# Patient Record
Sex: FEMALE | Race: WHITE | NOT HISPANIC OR LATINO | Employment: PART TIME | URBAN - METROPOLITAN AREA
[De-identification: names, ages, dates, MRNs, and addresses within clinical notes are randomized per-mention and may not be internally consistent; named-entity substitution may affect disease eponyms.]

---
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
## 2024-01-22 ENCOUNTER — POSTPARTUM VISIT (OUTPATIENT)
Dept: OBSTETRICS AND GYNECOLOGY | Facility: CLINIC | Age: 19
End: 2024-01-22
Payer: COMMERCIAL

## 2024-01-22 VITALS
BODY MASS INDEX: 31.7 KG/M2 | WEIGHT: 214 LBS | HEART RATE: 80 BPM | HEIGHT: 69 IN | DIASTOLIC BLOOD PRESSURE: 80 MMHG | SYSTOLIC BLOOD PRESSURE: 136 MMHG

## 2024-01-22 DIAGNOSIS — Z30.41 ENCOUNTER FOR SURVEILLANCE OF CONTRACEPTIVE PILLS: ICD-10-CM

## 2024-01-22 DIAGNOSIS — R03.0 ELEVATED BLOOD-PRESSURE READING WITHOUT DIAGNOSIS OF HYPERTENSION: Primary | ICD-10-CM

## 2024-01-22 PROCEDURE — 99213 OFFICE O/P EST LOW 20 MIN: CPT | Performed by: OBSTETRICS & GYNECOLOGY

## 2024-01-23 PROBLEM — R03.0 ELEVATED BLOOD-PRESSURE READING WITHOUT DIAGNOSIS OF HYPERTENSION: Status: ACTIVE | Noted: 2024-01-23

## 2024-02-19 ENCOUNTER — LAB (OUTPATIENT)
Dept: LAB | Facility: HOSPITAL | Age: 19
End: 2024-02-19
Payer: COMMERCIAL

## 2024-02-19 ENCOUNTER — OFFICE VISIT (OUTPATIENT)
Dept: FAMILY MEDICINE CLINIC | Facility: CLINIC | Age: 19
End: 2024-02-19
Payer: COMMERCIAL

## 2024-02-19 VITALS
OXYGEN SATURATION: 100 % | DIASTOLIC BLOOD PRESSURE: 66 MMHG | HEIGHT: 69 IN | SYSTOLIC BLOOD PRESSURE: 136 MMHG | WEIGHT: 212 LBS | HEART RATE: 76 BPM | BODY MASS INDEX: 31.4 KG/M2

## 2024-02-19 DIAGNOSIS — R19.7 DIARRHEA, UNSPECIFIED TYPE: Primary | ICD-10-CM

## 2024-02-19 DIAGNOSIS — R10.811 RIGHT UPPER QUADRANT ABDOMINAL TENDERNESS WITHOUT REBOUND TENDERNESS: ICD-10-CM

## 2024-02-19 DIAGNOSIS — K21.9 GASTROESOPHAGEAL REFLUX DISEASE WITHOUT ESOPHAGITIS: ICD-10-CM

## 2024-02-19 DIAGNOSIS — R19.7 DIARRHEA, UNSPECIFIED TYPE: ICD-10-CM

## 2024-02-19 LAB
ALBUMIN SERPL-MCNC: 4.6 G/DL (ref 3.5–5.2)
ALBUMIN/GLOB SERPL: 1.6 G/DL
ALP SERPL-CCNC: 55 U/L (ref 39–117)
ALT SERPL W P-5'-P-CCNC: 16 U/L (ref 1–33)
ANION GAP SERPL CALCULATED.3IONS-SCNC: 11.2 MMOL/L (ref 5–15)
AST SERPL-CCNC: 10 U/L (ref 1–32)
BASOPHILS # BLD AUTO: 0.05 10*3/MM3 (ref 0–0.2)
BASOPHILS NFR BLD AUTO: 0.7 % (ref 0–1.5)
BILIRUB SERPL-MCNC: 0.4 MG/DL (ref 0–1.2)
BUN SERPL-MCNC: 10 MG/DL (ref 6–20)
BUN/CREAT SERPL: 10.3 (ref 7–25)
CALCIUM SPEC-SCNC: 9.2 MG/DL (ref 8.6–10.5)
CHLORIDE SERPL-SCNC: 103 MMOL/L (ref 98–107)
CO2 SERPL-SCNC: 23.8 MMOL/L (ref 22–29)
CREAT SERPL-MCNC: 0.97 MG/DL (ref 0.57–1)
DEPRECATED RDW RBC AUTO: 41.3 FL (ref 37–54)
EGFRCR SERPLBLD CKD-EPI 2021: 86.5 ML/MIN/1.73
EOSINOPHIL # BLD AUTO: 0.08 10*3/MM3 (ref 0–0.4)
EOSINOPHIL NFR BLD AUTO: 1.1 % (ref 0.3–6.2)
ERYTHROCYTE [DISTWIDTH] IN BLOOD BY AUTOMATED COUNT: 14.6 % (ref 12.3–15.4)
GLOBULIN UR ELPH-MCNC: 2.9 GM/DL
GLUCOSE SERPL-MCNC: 71 MG/DL (ref 65–99)
H PYLORI IGG SER IA-ACNC: NEGATIVE
HCT VFR BLD AUTO: 39.2 % (ref 34–46.6)
HGB BLD-MCNC: 12.9 G/DL (ref 12–15.9)
IMM GRANULOCYTES # BLD AUTO: 0.03 10*3/MM3 (ref 0–0.05)
IMM GRANULOCYTES NFR BLD AUTO: 0.4 % (ref 0–0.5)
LYMPHOCYTES # BLD AUTO: 1.83 10*3/MM3 (ref 0.7–3.1)
LYMPHOCYTES NFR BLD AUTO: 24.3 % (ref 19.6–45.3)
MCH RBC QN AUTO: 26 PG (ref 26.6–33)
MCHC RBC AUTO-ENTMCNC: 32.9 G/DL (ref 31.5–35.7)
MCV RBC AUTO: 79 FL (ref 79–97)
MONOCYTES # BLD AUTO: 0.41 10*3/MM3 (ref 0.1–0.9)
MONOCYTES NFR BLD AUTO: 5.4 % (ref 5–12)
NEUTROPHILS NFR BLD AUTO: 5.14 10*3/MM3 (ref 1.7–7)
NEUTROPHILS NFR BLD AUTO: 68.1 % (ref 42.7–76)
NRBC BLD AUTO-RTO: 0 /100 WBC (ref 0–0.2)
PLATELET # BLD AUTO: 401 10*3/MM3 (ref 140–450)
PMV BLD AUTO: 10.9 FL (ref 6–12)
POTASSIUM SERPL-SCNC: 4.3 MMOL/L (ref 3.5–5.2)
PROT SERPL-MCNC: 7.5 G/DL (ref 6–8.5)
RBC # BLD AUTO: 4.96 10*6/MM3 (ref 3.77–5.28)
SODIUM SERPL-SCNC: 138 MMOL/L (ref 136–145)
WBC NRBC COR # BLD AUTO: 7.54 10*3/MM3 (ref 3.4–10.8)

## 2024-02-19 PROCEDURE — 1159F MED LIST DOCD IN RCRD: CPT | Performed by: NURSE PRACTITIONER

## 2024-02-19 PROCEDURE — 99214 OFFICE O/P EST MOD 30 MIN: CPT | Performed by: NURSE PRACTITIONER

## 2024-02-19 PROCEDURE — 85025 COMPLETE CBC W/AUTO DIFF WBC: CPT

## 2024-02-19 PROCEDURE — 36415 COLL VENOUS BLD VENIPUNCTURE: CPT

## 2024-02-19 PROCEDURE — 1160F RVW MEDS BY RX/DR IN RCRD: CPT | Performed by: NURSE PRACTITIONER

## 2024-02-19 PROCEDURE — 80053 COMPREHEN METABOLIC PANEL: CPT

## 2024-02-19 PROCEDURE — 86677 HELICOBACTER PYLORI ANTIBODY: CPT

## 2024-03-08 ENCOUNTER — HOSPITAL ENCOUNTER (OUTPATIENT)
Dept: ULTRASOUND IMAGING | Facility: HOSPITAL | Age: 19
Discharge: HOME | End: 2024-03-08
Payer: COMMERCIAL

## 2024-03-08 DIAGNOSIS — R10.811 RIGHT UPPER QUADRANT ABDOMINAL TENDERNESS WITHOUT REBOUND TENDERNESS: ICD-10-CM

## 2024-03-08 DIAGNOSIS — R19.7 DIARRHEA, UNSPECIFIED TYPE: ICD-10-CM

## 2024-03-08 PROCEDURE — 76705 ECHO EXAM OF ABDOMEN: CPT

## 2024-03-11 DIAGNOSIS — R10.811 RIGHT UPPER QUADRANT ABDOMINAL TENDERNESS WITHOUT REBOUND TENDERNESS: ICD-10-CM

## 2024-03-11 DIAGNOSIS — R19.7 DIARRHEA, UNSPECIFIED TYPE: Primary | ICD-10-CM

## 2024-04-24 ENCOUNTER — TELEPHONE (OUTPATIENT)
Dept: FAMILY MEDICINE CLINIC | Facility: CLINIC | Age: 19
End: 2024-04-24
Payer: COMMERCIAL

## 2024-04-29 ENCOUNTER — OFFICE VISIT (OUTPATIENT)
Dept: FAMILY MEDICINE CLINIC | Facility: CLINIC | Age: 19
End: 2024-04-29
Payer: COMMERCIAL

## 2024-04-29 VITALS
WEIGHT: 220 LBS | SYSTOLIC BLOOD PRESSURE: 132 MMHG | HEART RATE: 80 BPM | BODY MASS INDEX: 32.58 KG/M2 | OXYGEN SATURATION: 100 % | DIASTOLIC BLOOD PRESSURE: 66 MMHG | HEIGHT: 69 IN

## 2024-04-29 DIAGNOSIS — K59.00 CONSTIPATION, UNSPECIFIED CONSTIPATION TYPE: Primary | ICD-10-CM

## 2024-04-29 DIAGNOSIS — K62.5 BRIGHT RED BLOOD PER RECTUM: ICD-10-CM

## 2024-04-29 DIAGNOSIS — R35.0 URINARY FREQUENCY: ICD-10-CM

## 2024-04-29 DIAGNOSIS — R10.9 LEFT SIDED ABDOMINAL PAIN: ICD-10-CM

## 2024-04-29 LAB
BILIRUB BLD-MCNC: NEGATIVE MG/DL
CLARITY, POC: CLEAR
COLOR UR: YELLOW
EXPIRATION DATE: ABNORMAL
GLUCOSE UR STRIP-MCNC: NEGATIVE MG/DL
KETONES UR QL: NEGATIVE
LEUKOCYTE EST, POC: ABNORMAL
Lab: ABNORMAL
NITRITE UR-MCNC: NEGATIVE MG/ML
PH UR: 6.5 [PH] (ref 5–8)
PROT UR STRIP-MCNC: NEGATIVE MG/DL
RBC # UR STRIP: NEGATIVE /UL
SP GR UR: 1.03 (ref 1–1.03)
UROBILINOGEN UR QL: NORMAL

## 2024-04-29 PROCEDURE — 1160F RVW MEDS BY RX/DR IN RCRD: CPT | Performed by: NURSE PRACTITIONER

## 2024-04-29 PROCEDURE — 99214 OFFICE O/P EST MOD 30 MIN: CPT | Performed by: NURSE PRACTITIONER

## 2024-04-29 PROCEDURE — 87086 URINE CULTURE/COLONY COUNT: CPT | Performed by: NURSE PRACTITIONER

## 2024-04-29 PROCEDURE — 1159F MED LIST DOCD IN RCRD: CPT | Performed by: NURSE PRACTITIONER

## 2024-04-30 LAB — BACTERIA SPEC AEROBE CULT: NORMAL

## 2024-07-02 ENCOUNTER — OFFICE VISIT (OUTPATIENT)
Dept: FAMILY MEDICINE CLINIC | Facility: CLINIC | Age: 19
End: 2024-07-02
Payer: COMMERCIAL

## 2024-07-02 VITALS
SYSTOLIC BLOOD PRESSURE: 140 MMHG | DIASTOLIC BLOOD PRESSURE: 71 MMHG | BODY MASS INDEX: 34.36 KG/M2 | WEIGHT: 232 LBS | OXYGEN SATURATION: 100 % | HEIGHT: 69 IN | HEART RATE: 86 BPM

## 2024-07-02 DIAGNOSIS — E66.9 CLASS 1 OBESITY WITH BODY MASS INDEX (BMI) OF 34.0 TO 34.9 IN ADULT, UNSPECIFIED OBESITY TYPE, UNSPECIFIED WHETHER SERIOUS COMORBIDITY PRESENT: Primary | ICD-10-CM

## 2024-07-02 DIAGNOSIS — R63.5 WEIGHT GAIN: ICD-10-CM

## 2024-07-02 DIAGNOSIS — R03.0 ELEVATED BLOOD-PRESSURE READING WITHOUT DIAGNOSIS OF HYPERTENSION: ICD-10-CM

## 2024-07-02 PROBLEM — E66.811 CLASS 1 OBESITY WITH BODY MASS INDEX (BMI) OF 34.0 TO 34.9 IN ADULT: Status: ACTIVE | Noted: 2024-07-02

## 2024-07-02 PROCEDURE — 1159F MED LIST DOCD IN RCRD: CPT | Performed by: NURSE PRACTITIONER

## 2024-07-02 PROCEDURE — 1160F RVW MEDS BY RX/DR IN RCRD: CPT | Performed by: NURSE PRACTITIONER

## 2024-07-02 PROCEDURE — 99214 OFFICE O/P EST MOD 30 MIN: CPT | Performed by: NURSE PRACTITIONER

## 2024-07-02 RX ORDER — TOPIRAMATE 25 MG/1
25 TABLET ORAL 2 TIMES DAILY
Qty: 60 TABLET | Refills: 2 | Status: SHIPPED | OUTPATIENT
Start: 2024-07-02

## 2024-07-05 ENCOUNTER — TELEPHONE (OUTPATIENT)
Dept: FAMILY MEDICINE CLINIC | Facility: CLINIC | Age: 19
End: 2024-07-05
Payer: COMMERCIAL

## 2024-08-05 ENCOUNTER — OFFICE VISIT (OUTPATIENT)
Dept: FAMILY MEDICINE CLINIC | Facility: CLINIC | Age: 19
End: 2024-08-05
Payer: COMMERCIAL

## 2024-08-05 VITALS
WEIGHT: 227 LBS | DIASTOLIC BLOOD PRESSURE: 74 MMHG | OXYGEN SATURATION: 98 % | HEART RATE: 72 BPM | BODY MASS INDEX: 33.62 KG/M2 | SYSTOLIC BLOOD PRESSURE: 127 MMHG | HEIGHT: 69 IN | TEMPERATURE: 98.1 F

## 2024-08-05 DIAGNOSIS — R52 GENERALIZED BODY ACHES: ICD-10-CM

## 2024-08-05 DIAGNOSIS — J02.0 STREP PHARYNGITIS: Primary | ICD-10-CM

## 2024-08-05 DIAGNOSIS — U07.1 COVID-19 VIRUS DETECTED: ICD-10-CM

## 2024-08-05 DIAGNOSIS — J02.9 SORE THROAT: ICD-10-CM

## 2024-08-05 DIAGNOSIS — J06.9 UPPER RESPIRATORY TRACT INFECTION, UNSPECIFIED TYPE: ICD-10-CM

## 2024-08-05 LAB
EXPIRATION DATE: ABNORMAL
EXPIRATION DATE: ABNORMAL
FLUAV AG UPPER RESP QL IA.RAPID: NOT DETECTED
FLUBV AG UPPER RESP QL IA.RAPID: NOT DETECTED
INTERNAL CONTROL: ABNORMAL
INTERNAL CONTROL: ABNORMAL
Lab: ABNORMAL
Lab: ABNORMAL
S PYO AG THROAT QL: POSITIVE
SARS-COV-2 AG UPPER RESP QL IA.RAPID: DETECTED

## 2024-08-05 PROCEDURE — 87428 SARSCOV & INF VIR A&B AG IA: CPT | Performed by: STUDENT IN AN ORGANIZED HEALTH CARE EDUCATION/TRAINING PROGRAM

## 2024-08-05 PROCEDURE — 99213 OFFICE O/P EST LOW 20 MIN: CPT | Performed by: STUDENT IN AN ORGANIZED HEALTH CARE EDUCATION/TRAINING PROGRAM

## 2024-08-05 PROCEDURE — 87880 STREP A ASSAY W/OPTIC: CPT | Performed by: STUDENT IN AN ORGANIZED HEALTH CARE EDUCATION/TRAINING PROGRAM

## 2024-08-05 RX ORDER — AMOXICILLIN 500 MG/1
500 CAPSULE ORAL 2 TIMES DAILY
Qty: 20 CAPSULE | Refills: 0 | Status: SHIPPED | OUTPATIENT
Start: 2024-08-05 | End: 2024-08-15

## 2024-08-05 RX ORDER — GUAIFENESIN AND DEXTROMETHORPHAN HYDROBROMIDE 600; 30 MG/1; MG/1
1 TABLET, EXTENDED RELEASE ORAL 2 TIMES DAILY PRN
Qty: 8 TABLET | Refills: 0 | Status: SHIPPED | OUTPATIENT
Start: 2024-08-05

## 2024-10-11 ENCOUNTER — TELEPHONE (OUTPATIENT)
Dept: FAMILY MEDICINE CLINIC | Facility: CLINIC | Age: 19
End: 2024-10-11
Payer: COMMERCIAL

## 2024-11-04 ENCOUNTER — PATIENT MESSAGE (OUTPATIENT)
Dept: OBSTETRICS AND GYNECOLOGY | Facility: CLINIC | Age: 19
End: 2024-11-04
Payer: COMMERCIAL

## 2024-11-04 DIAGNOSIS — Z30.011 ENCOUNTER FOR INITIAL PRESCRIPTION OF CONTRACEPTIVE PILLS: ICD-10-CM

## 2024-11-04 RX ORDER — NORETHINDRONE ACETATE AND ETHINYL ESTRADIOL, AND FERROUS FUMARATE 1MG-20(24)
1 KIT ORAL DAILY
Qty: 28 TABLET | Refills: 12 | OUTPATIENT
Start: 2024-11-04

## 2024-11-04 RX ORDER — NORETHINDRONE ACETATE AND ETHINYL ESTRADIOL, AND FERROUS FUMARATE 1MG-20(24)
1 KIT ORAL DAILY
Qty: 28 TABLET | Refills: 12 | Status: SHIPPED | OUTPATIENT
Start: 2024-11-04

## 2024-11-04 NOTE — TELEPHONE ENCOUNTER
The pt is requesting refills of her Carolyne 24 Fe medication.  She was last seen on 01/22/24 and has a follow up schedueld for 01/27/25.  The Rx was last sent to  the pharmacy today #28 with 12 additional refills. I have denied this request as a duplicate order

## 2025-03-04 ENCOUNTER — TELEPHONE (OUTPATIENT)
Dept: FAMILY MEDICINE CLINIC | Facility: CLINIC | Age: 20
End: 2025-03-04

## 2025-03-04 ENCOUNTER — OFFICE VISIT (OUTPATIENT)
Dept: FAMILY MEDICINE CLINIC | Facility: CLINIC | Age: 20
End: 2025-03-04
Payer: COMMERCIAL

## 2025-03-04 VITALS
BODY MASS INDEX: 34.21 KG/M2 | HEART RATE: 75 BPM | OXYGEN SATURATION: 100 % | WEIGHT: 231 LBS | DIASTOLIC BLOOD PRESSURE: 82 MMHG | HEIGHT: 69 IN | SYSTOLIC BLOOD PRESSURE: 128 MMHG

## 2025-03-04 DIAGNOSIS — Z13.220 LIPID SCREENING: ICD-10-CM

## 2025-03-04 DIAGNOSIS — R14.3 EXCESSIVE GAS: ICD-10-CM

## 2025-03-04 DIAGNOSIS — Z00.00 ANNUAL PHYSICAL EXAM: Primary | ICD-10-CM

## 2025-03-04 DIAGNOSIS — Z83.3 FAMILY HISTORY OF DIABETES MELLITUS (DM): ICD-10-CM

## 2025-03-04 DIAGNOSIS — E16.2 HYPOGLYCEMIA: ICD-10-CM

## 2025-03-04 DIAGNOSIS — Z13.29 THYROID DISORDER SCREEN: ICD-10-CM

## 2025-03-04 DIAGNOSIS — E66.811 CLASS 1 OBESITY WITH BODY MASS INDEX (BMI) OF 34.0 TO 34.9 IN ADULT, UNSPECIFIED OBESITY TYPE, UNSPECIFIED WHETHER SERIOUS COMORBIDITY PRESENT: ICD-10-CM

## 2025-03-04 DIAGNOSIS — K59.00 CONSTIPATION, UNSPECIFIED CONSTIPATION TYPE: ICD-10-CM

## 2025-03-04 DIAGNOSIS — R03.0 ELEVATED BLOOD-PRESSURE READING WITHOUT DIAGNOSIS OF HYPERTENSION: ICD-10-CM

## 2025-03-04 DIAGNOSIS — R14.0 ABDOMINAL BLOATING: ICD-10-CM

## 2025-03-04 RX ORDER — BLOOD-GLUCOSE METER
1 KIT MISCELLANEOUS DAILY
Qty: 1 EACH | Refills: 0 | Status: SHIPPED | OUTPATIENT
Start: 2025-03-04

## 2025-03-04 RX ORDER — BLOOD-GLUCOSE METER
1 KIT MISCELLANEOUS AS NEEDED
Qty: 1 EACH | Refills: 0 | Status: SHIPPED | OUTPATIENT
Start: 2025-03-04 | End: 2025-03-04 | Stop reason: SDUPTHER

## 2025-03-04 NOTE — ASSESSMENT & PLAN NOTE
Patient to start daily Citrucel, discussed also recommend PRB use of MiraLAX if no bowel movement every 3 days, recommend MiraLAX every other day for the next week then as needed.

## 2025-03-04 NOTE — ASSESSMENT & PLAN NOTE
Patient's (Body mass index is 34.11 kg/m².) indicates that they are obese (BMI >30) with health conditions that include none . Weight is worsening. BMI  is above average; BMI management plan is completed. We discussed portion control and increasing exercise.  We also discussed possible prescription for Wegovy or Zepbound, however patient currently having probable hypoglycemia episodes and we did discuss that this medication has potential to increase this risk, for now I would recommend no medication until further workup is done.  Patient will work on diet and exercise

## 2025-03-04 NOTE — TELEPHONE ENCOUNTER
Pharmacy Name: [facility] - [place] - [address] - [phone]  - [phone]      Pharmacy representative phone number: [phone]    What medication are you calling in regards to: glucose monitor monitoring kit     What question does the pharmacy have: NEEDING TO KNOW HOW MANY TIMES A DAY SHOULD PATIENT TEST HER SUGAR. THEY WILL NEED A NEW SCRIPT WITH REVISED INSTRUCTIONS.     Who is the provider that prescribed the medication: TEE

## 2025-03-04 NOTE — ASSESSMENT & PLAN NOTE
Patient reports blood pressure was also elevated at her workplace when she had the episode of hypoglycemia, it was mildly elevated in office today that improved upon manual recheck, patient does have history of issues with blood pressure during pregnancy, but has been untreated since that time, she will continue to monitor, working on weight loss

## 2025-03-04 NOTE — PROGRESS NOTES
Chief Complaint  Annual Exam, weight loss     SUBJECTIVE  Shelby Chikis Vogt presents to [facility] for annual exam to discuss possible weight loss options. Pt has tried and failed Topamax last year. Current weight is 231 BMI 34.11 states she tries to eat healthy and exercises some.    Patient has a few other concerns to discuss-  states she has been having a lot of gas recently and feeling bloated, patient admits she does have a lot of issues with constipation.  Has tried stool softeners with no improvement.  We discussed this about a year ago and patient was referred to GI but she had to cancel the appointment and never rescheduled    Patient also complaining of some episodes recently.  States that about 3 weeks ago she had an episode where she woke up and was feeling weak, dizzy, states not even sure how she made it to work, but they checked her blood sugar there and it was 60, she was given glucose tabs and recovered.  States she then had another episode just a few days ago, was not able to check her blood sugar at that time but ate and started feeling better.  States that she has never had any issues with hypoglycemia in the past, has not had any problems with high blood sugar, she does have some family history of diabetes.    She is not taking any new medications, the only daily medication is her birth control, no over-the-counter supplements, no increased exercise or activity    History of Present Illness  Past Medical History:   Diagnosis Date    Depression     GERD (gastroesophageal reflux disease)     Gestational hypertension, third trimester 10/04/2023      Family History   Problem Relation Age of Onset    Breast cancer Maternal Grandmother 52    Ovarian cancer Neg Hx     Uterine cancer Neg Hx     Prostate cancer Neg Hx     Colon cancer Neg Hx       Past Surgical History:   Procedure Laterality Date     SECTION N/A 10/5/2023    Procedure:  SECTION  "PRIMARY;  Surgeon: Dilshad Abreu MD;  Location: [facility] LABOR DELIVERY;  Service: Gynecology;  Laterality: N/A;        Current Outpatient Medications:     Carolyne 24 Fe 1-20 MG-MCG(24) per tablet, TAKE 1 TABLET BY MOUTH EVERY DAY, Disp: 28 tablet, Rfl: 12    glucose monitor monitoring kit, Use 1 each Daily. s/s hypoglycemia, Disp: 1 each, Rfl: 0    methylcellulose (Citrucel) oral powder, Take 2 Applications by mouth Daily., Disp: 454 g, Rfl: 0    OBJECTIVE  Vital Signs:   /82   Pulse 75   Ht 175.3 cm (69\")   Wt 105 kg (231 lb)   SpO2 100%   BMI 34.11 kg/m²    Estimated body mass index is 34.11 kg/m² as calculated from the following:    Height as of this encounter: 175.3 cm (69\").    Weight as of this encounter: 105 kg (231 lb).     Wt Readings from Last 3 Encounters:   03/04/25 105 kg (231 lb)   08/05/24 103 kg (227 lb) (99%, Z= 2.29)*   07/25/24 103 kg (227 lb 11.2 oz) (99%, Z= 2.30)*     * Growth percentiles are based on CDC (Girls, 2-20 Years) data.     BP Readings from Last 3 Encounters:   03/04/25 128/82   08/05/24 127/74   07/25/24 147/84       Physical Exam  Vitals reviewed.   Constitutional:       General: She is not in acute distress.     Appearance: Normal appearance. She is well-developed. She is not diaphoretic.   HENT:      Head: Normocephalic and atraumatic. Hair is normal.      Right Ear: Hearing, tympanic membrane, ear canal and external ear normal. No decreased hearing noted. No drainage.      Left Ear: Hearing, tympanic membrane, ear canal and external ear normal. No decreased hearing noted.      Nose: Nose normal. No nasal deformity.      Mouth/Throat:      Mouth: Mucous membranes are moist.   Eyes:      General: Lids are normal.         Right eye: No discharge.         Left eye: No discharge.      Extraocular Movements: Extraocular movements intact.      Conjunctiva/sclera: Conjunctivae normal.      Pupils: Pupils are equal, round, and reactive to light.   Neck:      Thyroid: No " thyromegaly.      Vascular: No JVD.   Cardiovascular:      Rate and Rhythm: Normal rate and regular rhythm.      Pulses: Normal pulses.      Heart sounds: Normal heart sounds. No murmur heard.     No friction rub. No gallop.   Pulmonary:      Effort: Pulmonary effort is normal. No respiratory distress.      Breath sounds: Normal breath sounds. No wheezing, rhonchi or rales.   Chest:      Chest wall: No tenderness.   Abdominal:      General: Bowel sounds are normal. There is no distension.      Palpations: Abdomen is soft. There is no mass.      Tenderness: There is no abdominal tenderness. There is no guarding or rebound.      Hernia: No hernia is present.   Musculoskeletal:         General: No tenderness or deformity. Normal range of motion.      Cervical back: Normal range of motion and neck supple.   Lymphadenopathy:      Cervical: No cervical adenopathy.   Skin:     General: Skin is warm and dry.      Findings: No erythema or rash.   Neurological:      Mental Status: She is alert and oriented to person, place, and time.      Cranial Nerves: No cranial nerve deficit.      Motor: No abnormal muscle tone.      Coordination: Coordination normal.      Deep Tendon Reflexes: Reflexes are normal and symmetric. Reflexes normal.   Psychiatric:         Mood and Affect: Mood and affect normal.         Behavior: Behavior normal.         Thought Content: Thought content normal.         Judgment: Judgment normal.          Result Review                No Images in the past 120 days found..     The above data has been reviewed by STUART Dorado 03/04/2025 11:14 EST.          Patient Care Team:  Ese Banuelos APRN as PCP - General (Nurse Practitioner)            ASSESSMENT & PLAN    Diagnoses and all orders for this visit:    1. Annual physical exam (Primary)  -     Comprehensive Metabolic Panel; Future  -     CBC & Differential; Future  -     Lipid Panel; Future  -     TSH Rfx On Abnormal To Free T4; Future  -      Hemoglobin A1c; Future    2. Hypoglycemia  -     Hemoglobin A1c; Future  -     Discontinue: glucose monitor monitoring kit; Use 1 each As Needed (s/s hypoglycemia).  Dispense: 1 each; Refill: 0    3. Abdominal bloating  -     H. Pylori Breath Test - Breath, Lung; Future  -     XR Abdomen KUB; Future    4. Constipation, unspecified constipation type  Assessment & Plan:  Patient to start daily Citrucel, discussed also recommend PRB use of MiraLAX if no bowel movement every 3 days, recommend MiraLAX every other day for the next week then as needed.     Orders:  -     XR Abdomen KUB; Future  -     methylcellulose (Citrucel) oral powder; Take 2 Applications by mouth Daily.  Dispense: 454 g; Refill: 0  -     Ambulatory Referral to Gastroenterology    5. Excessive gas  -     H. Pylori Breath Test - Breath, Lung; Future    6. Family history of diabetes mellitus (DM)  -     Hemoglobin A1c; Future    7. Thyroid disorder screen  -     TSH Rfx On Abnormal To Free T4; Future    8. Lipid screening  -     Lipid Panel; Future    9. Elevated blood-pressure reading without diagnosis of hypertension  Assessment & Plan:  Patient reports blood pressure was also elevated at her workplace when she had the episode of hypoglycemia, it was mildly elevated in office today that improved upon manual recheck, patient does have history of issues with blood pressure during pregnancy, but has been untreated since that time, she will continue to monitor, working on weight loss      10. Class 1 obesity with body mass index (BMI) of 34.0 to 34.9 in adult, unspecified obesity type, unspecified whether serious comorbidity present  Assessment & Plan:  Patient's (Body mass index is 34.11 kg/m².) indicates that they are obese (BMI >30) with health conditions that include none . Weight is worsening. BMI  is above average; BMI management plan is completed. We discussed portion control and increasing exercise.  We also discussed possible prescription for  Wegovy or Zepbound, however patient currently having probable hypoglycemia episodes and we did discuss that this medication has potential to increase this risk, for now I would recommend no medication until further workup is done.  Patient will work on diet and exercise           Tobacco Use: Low Risk  (3/4/2025)    Patient History     Smoking Tobacco Use: Never     Smokeless Tobacco Use: Never     Passive Exposure: Never     The patient is advised to begin progressive daily aerobic exercise program, follow a low fat, low cholesterol diet, attempt to lose weight, continue current medications, and return for routine annual checkups.      Follow Up     Return if symptoms worsen or fail to improve.        Patient was given instructions and counseling regarding her condition or for health maintenance advice. Please see specific information pulled into the AVS if appropriate.   I have reviewed information obtained and documented by others and I have confirmed the accuracy of this documented note.    STUART Dorado

## 2025-03-21 ENCOUNTER — PATIENT MESSAGE (OUTPATIENT)
Dept: FAMILY MEDICINE CLINIC | Facility: CLINIC | Age: 20
End: 2025-03-21
Payer: COMMERCIAL

## 2025-03-26 ENCOUNTER — OFFICE VISIT (OUTPATIENT)
Dept: FAMILY MEDICINE CLINIC | Facility: CLINIC | Age: 20
End: 2025-03-26
Payer: COMMERCIAL

## 2025-03-26 ENCOUNTER — TELEPHONE (OUTPATIENT)
Dept: FAMILY MEDICINE CLINIC | Facility: CLINIC | Age: 20
End: 2025-03-26

## 2025-03-26 VITALS
BODY MASS INDEX: 34.21 KG/M2 | WEIGHT: 231 LBS | HEIGHT: 69 IN | OXYGEN SATURATION: 99 % | SYSTOLIC BLOOD PRESSURE: 132 MMHG | DIASTOLIC BLOOD PRESSURE: 78 MMHG

## 2025-03-26 DIAGNOSIS — E66.811 CLASS 1 OBESITY WITH BODY MASS INDEX (BMI) OF 34.0 TO 34.9 IN ADULT, UNSPECIFIED OBESITY TYPE, UNSPECIFIED WHETHER SERIOUS COMORBIDITY PRESENT: Primary | ICD-10-CM

## 2025-03-26 PROCEDURE — 99214 OFFICE O/P EST MOD 30 MIN: CPT | Performed by: NURSE PRACTITIONER

## 2025-03-26 RX ORDER — SEMAGLUTIDE 0.25 MG/.5ML
0.25 INJECTION, SOLUTION SUBCUTANEOUS WEEKLY
Qty: 2 ML | Refills: 1 | Status: SHIPPED | OUTPATIENT
Start: 2025-03-26

## 2025-03-26 RX ORDER — SEMAGLUTIDE 0.25 MG/.5ML
0.25 INJECTION, SOLUTION SUBCUTANEOUS WEEKLY
Qty: 2 ML | Refills: 1 | Status: SHIPPED | OUTPATIENT
Start: 2025-03-26 | End: 2025-03-26 | Stop reason: SDUPTHER

## 2025-03-26 NOTE — ASSESSMENT & PLAN NOTE
Patient's (Body mass index is 34.11 kg/m².) indicates that they are obese (BMI >30) with health conditions that include none . Weight is unchanged. BMI  is above average; BMI management plan is completed. We discussed portion control, increasing exercise, and pharmacologic options including .  Wegovy.  Side effects and administration of medication discussed.  Patient denies any personal history of pancreatitis or personal family history of medullary thyroid cancer or multiple endocrine neoplasia.  We will follow-up in 6 weeks for reevaluation  .

## 2025-03-26 NOTE — TELEPHONE ENCOUNTER
Caller: Shelby Vogt    Relationship to patient: Self    Best call back number: [phone]    Patient is needing: PATIENT CALLED STATING THE ORIGINAL PHARMACY HER WEGOVY WAS SENT TO DOES NOT CURRENTLY HAVE THIS MEDICATION IN STOCK AND SHE WOULD LIKE TO RESENT TO THE [facility] IN [place] THAT DOES.

## 2025-03-26 NOTE — PROGRESS NOTES
Chief Complaint  Obesity    SUBJECTIVE  Shelby Chikis Vogt presents to [facility] to discuss weight loss injection options.     Pt will walk 30 - 60 mins on treadmill a few times a week. Pt has a year old son at home.     Patient states that she has tried losing weight on her own at home but just cannot seem to have any luck.      Patient was seen earlier this month for episodes of possible hypoglycemia.  Was only able to check her sugar once but it was 60.  After eating and resting symptoms improved.  We sent patient with prescription for glucometer to monitor her blood sugar.  Pt states has been checking her glucose about every other day and had been in the 80's to 90's typically, sometimes in the 70s, has not had any readings in the 60's, and has not really had any other episodes where she has felt bad.. Has also checked it a few times when she has felt a little off (tired or a little headache) and it has never been low.  A1c was ordered but patient has not yet had that completed, she intends to do this in the morning as she is off tomorrow.    History of Present Illness  Past Medical History:   Diagnosis Date    Depression     GERD (gastroesophageal reflux disease)     Gestational hypertension, third trimester 10/04/2023      Family History   Problem Relation Age of Onset    Breast cancer Maternal Grandmother 52    Ovarian cancer Neg Hx     Uterine cancer Neg Hx     Prostate cancer Neg Hx     Colon cancer Neg Hx       Past Surgical History:   Procedure Laterality Date     SECTION N/A 10/5/2023    Procedure:  SECTION PRIMARY;  Surgeon: Dilshad Abreu MD;  Location: [facility] LABOR DELIVERY;  Service: Gynecology;  Laterality: N/A;        Current Outpatient Medications:     glucose monitor monitoring kit, Use 1 each Daily. s/s hypoglycemia, Disp: 1 each, Rfl: 0    Carolyne 24 Fe 1-20 MG-MCG(24) per tablet, TAKE 1 TABLET BY MOUTH EVERY DAY, Disp: 28 tablet, Rfl:  "12    methylcellulose (Citrucel) oral powder, Take 2 Applications by mouth Daily., Disp: 454 g, Rfl: 0    Semaglutide-Weight Management (Wegovy) 0.25 MG/0.5ML solution auto-injector, Inject 0.5 mL under the skin into the appropriate area as directed 1 (One) Time Per Week., Disp: 2 mL, Rfl: 1    OBJECTIVE  Vital Signs:   /78   Ht 175.3 cm (69\")   Wt 105 kg (231 lb)   SpO2 99%   BMI 34.11 kg/m²    Estimated body mass index is 34.11 kg/m² as calculated from the following:    Height as of this encounter: 175.3 cm (69\").    Weight as of this encounter: 105 kg (231 lb).     Wt Readings from Last 3 Encounters:   03/26/25 105 kg (231 lb)   03/04/25 105 kg (231 lb)   08/05/24 103 kg (227 lb) (99%, Z= 2.29)*     * Growth percentiles are based on CDC (Girls, 2-20 Years) data.     BP Readings from Last 3 Encounters:   03/26/25 132/78   03/04/25 128/82   08/05/24 127/74       Physical Exam  Vitals reviewed.   Constitutional:       Appearance: Normal appearance. She is well-developed.   HENT:      Head: Normocephalic and atraumatic.      Right Ear: External ear normal.      Left Ear: External ear normal.   Eyes:      Conjunctiva/sclera: Conjunctivae normal.      Pupils: Pupils are equal, round, and reactive to light.   Cardiovascular:      Rate and Rhythm: Normal rate and regular rhythm.      Heart sounds: No murmur heard.     No friction rub. No gallop.   Pulmonary:      Effort: Pulmonary effort is normal.      Breath sounds: Normal breath sounds. No wheezing or rhonchi.   Skin:     General: Skin is warm and dry.   Neurological:      Mental Status: She is alert and oriented to person, place, and time.      Cranial Nerves: No cranial nerve deficit.   Psychiatric:         Mood and Affect: Mood and affect normal.         Behavior: Behavior normal.         Thought Content: Thought content normal.         Judgment: Judgment normal.          Result Review                No Images in the past 120 days found..     The above " data has been reviewed by STUART Dorado 03/26/2025 12:23 EDT.          Patient Care Team:  Ese Banuelos APRN as PCP - General (Nurse Practitioner)            ASSESSMENT & PLAN    Diagnoses and all orders for this visit:    1. Class 1 obesity with body mass index (BMI) of 34.0 to 34.9 in adult, unspecified obesity type, unspecified whether serious comorbidity present (Primary)  Assessment & Plan:  Patient's (Body mass index is 34.11 kg/m².) indicates that they are obese (BMI >30) with health conditions that include none . Weight is unchanged. BMI  is above average; BMI management plan is completed. We discussed portion control, increasing exercise, and pharmacologic options including .  Wegovy.  Side effects and administration of medication discussed.  Patient denies any personal history of pancreatitis or personal family history of medullary thyroid cancer or multiple endocrine neoplasia.  We will follow-up in 6 weeks for reevaluation  .     Orders:  -     Discontinue: Semaglutide-Weight Management (Wegovy) 0.25 MG/0.5ML solution auto-injector; Inject 0.5 mL under the skin into the appropriate area as directed 1 (One) Time Per Week.  Dispense: 2 mL; Refill: 1  -     Semaglutide-Weight Management (Wegovy) 0.25 MG/0.5ML solution auto-injector; Inject 0.5 mL under the skin into the appropriate area as directed 1 (One) Time Per Week.  Dispense: 2 mL; Refill: 1    Discussed with patient that she must have the A1c checked so that we can ensure no risk for hypoglycemia    Tobacco Use: Low Risk  (3/26/2025)    Patient History     Smoking Tobacco Use: Never     Smokeless Tobacco Use: Never     Passive Exposure: Never       Follow Up     Return in about 6 weeks (around 5/7/2025), or if symptoms worsen or fail to improve.        Patient was given instructions and counseling regarding her condition or for health maintenance advice. Please see specific information pulled into the AVS if appropriate.   I have  reviewed information obtained and documented by others and I have confirmed the accuracy of this documented note.    Ese Banuelos APRN

## 2025-03-27 ENCOUNTER — TELEPHONE (OUTPATIENT)
Dept: FAMILY MEDICINE CLINIC | Facility: CLINIC | Age: 20
End: 2025-03-27
Payer: COMMERCIAL

## 2025-03-27 NOTE — TELEPHONE ENCOUNTER
Caller: [facility] - [place], [place] - [address] - [phone]  - [phone] FX    Relationship: Pharmacy    Best call back number: [phone]     What was the call regarding: JENSEN FROM [facility] STATES THAT SHE NEEDS CONFIRMATION THAT THE OFFICE RECEIVED THE FAX SHE SENT OVER FOR THE PATIENT'S WEGOVY PRIOR AUTHORIZATION.     HUB CONFIRMED CORRECT FAX NUMBER.     JENSEN STATES THAT SHE WOULD LIKE A CALLBACK WITH CONFIRMATION

## 2025-03-28 ENCOUNTER — LAB (OUTPATIENT)
Dept: LAB | Facility: HOSPITAL | Age: 20
End: 2025-03-28
Payer: COMMERCIAL

## 2025-03-28 ENCOUNTER — TELEPHONE (OUTPATIENT)
Dept: FAMILY MEDICINE CLINIC | Facility: CLINIC | Age: 20
End: 2025-03-28
Payer: COMMERCIAL

## 2025-03-28 DIAGNOSIS — Z13.29 THYROID DISORDER SCREEN: ICD-10-CM

## 2025-03-28 DIAGNOSIS — Z13.220 LIPID SCREENING: ICD-10-CM

## 2025-03-28 DIAGNOSIS — K59.00 CONSTIPATION, UNSPECIFIED CONSTIPATION TYPE: ICD-10-CM

## 2025-03-28 DIAGNOSIS — R10.9 LEFT SIDED ABDOMINAL PAIN: ICD-10-CM

## 2025-03-28 DIAGNOSIS — R14.0 ABDOMINAL BLOATING: ICD-10-CM

## 2025-03-28 DIAGNOSIS — Z83.3 FAMILY HISTORY OF DIABETES MELLITUS (DM): ICD-10-CM

## 2025-03-28 DIAGNOSIS — R63.5 WEIGHT GAIN: ICD-10-CM

## 2025-03-28 DIAGNOSIS — E16.2 HYPOGLYCEMIA: ICD-10-CM

## 2025-03-28 DIAGNOSIS — R14.3 EXCESSIVE GAS: ICD-10-CM

## 2025-03-28 DIAGNOSIS — E66.811 CLASS 1 OBESITY WITH BODY MASS INDEX (BMI) OF 34.0 TO 34.9 IN ADULT, UNSPECIFIED OBESITY TYPE, UNSPECIFIED WHETHER SERIOUS COMORBIDITY PRESENT: ICD-10-CM

## 2025-03-28 DIAGNOSIS — K62.5 BRIGHT RED BLOOD PER RECTUM: ICD-10-CM

## 2025-03-28 DIAGNOSIS — Z00.00 ANNUAL PHYSICAL EXAM: ICD-10-CM

## 2025-03-28 LAB
ALBUMIN SERPL-MCNC: 4.1 G/DL (ref 3.5–5.2)
ALBUMIN/GLOB SERPL: 1.2 G/DL
ALP SERPL-CCNC: 52 U/L (ref 39–117)
ALT SERPL W P-5'-P-CCNC: 13 U/L (ref 1–33)
AMYLASE SERPL-CCNC: 37 U/L (ref 28–100)
ANION GAP SERPL CALCULATED.3IONS-SCNC: 13 MMOL/L (ref 5–15)
AST SERPL-CCNC: 17 U/L (ref 1–32)
BASOPHILS # BLD AUTO: 0.03 10*3/MM3 (ref 0–0.2)
BASOPHILS NFR BLD AUTO: 0.3 % (ref 0–1.5)
BILIRUB SERPL-MCNC: 0.4 MG/DL (ref 0–1.2)
BUN SERPL-MCNC: 8 MG/DL (ref 6–20)
BUN/CREAT SERPL: 9.9 (ref 7–25)
CALCIUM SPEC-SCNC: 9.4 MG/DL (ref 8.6–10.5)
CHLORIDE SERPL-SCNC: 106 MMOL/L (ref 98–107)
CHOLEST SERPL-MCNC: 177 MG/DL (ref 0–200)
CO2 SERPL-SCNC: 23 MMOL/L (ref 22–29)
CREAT SERPL-MCNC: 0.81 MG/DL (ref 0.57–1)
DEPRECATED RDW RBC AUTO: 40.8 FL (ref 37–54)
EGFRCR SERPLBLD CKD-EPI 2021: 106.7 ML/MIN/1.73
EOSINOPHIL # BLD AUTO: 0.08 10*3/MM3 (ref 0–0.4)
EOSINOPHIL NFR BLD AUTO: 0.9 % (ref 0.3–6.2)
ERYTHROCYTE [DISTWIDTH] IN BLOOD BY AUTOMATED COUNT: 13.7 % (ref 12.3–15.4)
GLOBULIN UR ELPH-MCNC: 3.3 GM/DL
GLUCOSE SERPL-MCNC: 92 MG/DL (ref 65–99)
HCT VFR BLD AUTO: 37.1 % (ref 34–46.6)
HDLC SERPL-MCNC: 62 MG/DL (ref 40–60)
HGB BLD-MCNC: 11.8 G/DL (ref 12–15.9)
IMM GRANULOCYTES # BLD AUTO: 0.03 10*3/MM3 (ref 0–0.05)
IMM GRANULOCYTES NFR BLD AUTO: 0.3 % (ref 0–0.5)
LDLC SERPL CALC-MCNC: 101 MG/DL (ref 0–100)
LDLC/HDLC SERPL: 1.61 {RATIO}
LYMPHOCYTES # BLD AUTO: 2.51 10*3/MM3 (ref 0.7–3.1)
LYMPHOCYTES NFR BLD AUTO: 28.2 % (ref 19.6–45.3)
MCH RBC QN AUTO: 26.3 PG (ref 26.6–33)
MCHC RBC AUTO-ENTMCNC: 31.8 G/DL (ref 31.5–35.7)
MCV RBC AUTO: 82.8 FL (ref 79–97)
MONOCYTES # BLD AUTO: 0.52 10*3/MM3 (ref 0.1–0.9)
MONOCYTES NFR BLD AUTO: 5.8 % (ref 5–12)
NEUTROPHILS NFR BLD AUTO: 5.72 10*3/MM3 (ref 1.7–7)
NEUTROPHILS NFR BLD AUTO: 64.5 % (ref 42.7–76)
NRBC BLD AUTO-RTO: 0 /100 WBC (ref 0–0.2)
PLATELET # BLD AUTO: 329 10*3/MM3 (ref 140–450)
PMV BLD AUTO: 11 FL (ref 6–12)
POTASSIUM SERPL-SCNC: 4 MMOL/L (ref 3.5–5.2)
PROT SERPL-MCNC: 7.4 G/DL (ref 6–8.5)
RBC # BLD AUTO: 4.48 10*6/MM3 (ref 3.77–5.28)
SODIUM SERPL-SCNC: 142 MMOL/L (ref 136–145)
T4 FREE SERPL-MCNC: 1.45 NG/DL (ref 0.92–1.68)
TRIGL SERPL-MCNC: 75 MG/DL (ref 0–150)
TSH SERPL DL<=0.05 MIU/L-ACNC: 0.96 UIU/ML (ref 0.27–4.2)
TSH SERPL DL<=0.05 MIU/L-ACNC: 0.96 UIU/ML (ref 0.27–4.2)
VLDLC SERPL-MCNC: 14 MG/DL (ref 5–40)
WBC NRBC COR # BLD AUTO: 8.89 10*3/MM3 (ref 3.4–10.8)

## 2025-03-28 PROCEDURE — 36415 COLL VENOUS BLD VENIPUNCTURE: CPT

## 2025-03-28 PROCEDURE — 84439 ASSAY OF FREE THYROXINE: CPT

## 2025-03-28 PROCEDURE — 80061 LIPID PANEL: CPT

## 2025-03-28 PROCEDURE — 80050 GENERAL HEALTH PANEL: CPT

## 2025-03-28 PROCEDURE — 84443 ASSAY THYROID STIM HORMONE: CPT

## 2025-03-28 PROCEDURE — 82150 ASSAY OF AMYLASE: CPT

## 2025-03-28 PROCEDURE — 83013 H PYLORI (C-13) BREATH: CPT

## 2025-03-28 PROCEDURE — 83036 HEMOGLOBIN GLYCOSYLATED A1C: CPT

## 2025-03-28 NOTE — TELEPHONE ENCOUNTER
Caller: Shelby Vogt    Relationship: Self    Best call back number: [phone]    What was the call regarding: PATIENT IS ASKING HOW LONG DOES SHE NEED TO FAST BEFORE HAVING THE LABS DONE THAT IS NEEDED BEFORE PRIOR AUTH IS STARTED.

## 2025-03-29 LAB — HBA1C MFR BLD: 4.4 % (ref 4.8–5.6)

## 2025-03-30 LAB — UREA BREATH TEST QL: NEGATIVE

## 2025-03-31 ENCOUNTER — LAB (OUTPATIENT)
Dept: LAB | Facility: HOSPITAL | Age: 20
End: 2025-03-31
Payer: COMMERCIAL

## 2025-03-31 ENCOUNTER — TELEPHONE (OUTPATIENT)
Dept: FAMILY MEDICINE CLINIC | Facility: CLINIC | Age: 20
End: 2025-03-31
Payer: COMMERCIAL

## 2025-03-31 DIAGNOSIS — D64.9 ANEMIA, UNSPECIFIED TYPE: ICD-10-CM

## 2025-03-31 LAB
FERRITIN SERPL-MCNC: 59.5 NG/ML (ref 13–150)
FOLATE SERPL-MCNC: 7.25 NG/ML (ref 4.78–24.2)
IRON 24H UR-MRATE: 45 MCG/DL (ref 37–145)
IRON SATN MFR SERPL: 11 % (ref 20–50)
TIBC SERPL-MCNC: 411 MCG/DL (ref 298–536)
TRANSFERRIN SERPL-MCNC: 276 MG/DL (ref 200–360)
VIT B12 BLD-MCNC: 306 PG/ML (ref 211–946)

## 2025-03-31 PROCEDURE — 84466 ASSAY OF TRANSFERRIN: CPT

## 2025-03-31 PROCEDURE — 82728 ASSAY OF FERRITIN: CPT

## 2025-03-31 PROCEDURE — 82607 VITAMIN B-12: CPT

## 2025-03-31 PROCEDURE — 36415 COLL VENOUS BLD VENIPUNCTURE: CPT

## 2025-03-31 PROCEDURE — 83540 ASSAY OF IRON: CPT

## 2025-03-31 PROCEDURE — 82746 ASSAY OF FOLIC ACID SERUM: CPT

## 2025-03-31 NOTE — TELEPHONE ENCOUNTER
Caller: Shelby Vogt    Relationship: Self    Best call back number: [phone]     What test was performed: BLOOD WORK    When was the test performed: 3.28.25    Where was the test performed:  [facility]    Additional notes:

## 2025-04-02 ENCOUNTER — TELEPHONE (OUTPATIENT)
Dept: FAMILY MEDICINE CLINIC | Facility: CLINIC | Age: 20
End: 2025-04-02
Payer: COMMERCIAL

## 2025-04-02 NOTE — TELEPHONE ENCOUNTER
Caller: Shelby Vogt    Relationship: Self    Best call back number: [phone]    What was the call regarding: PATIENT IS CHECKING STATUS OF PRIOR AUTH FOR WEGOVY.

## 2025-04-07 ENCOUNTER — PATIENT MESSAGE (OUTPATIENT)
Dept: FAMILY MEDICINE CLINIC | Facility: CLINIC | Age: 20
End: 2025-04-07

## 2025-04-07 NOTE — TELEPHONE ENCOUNTER
Patient called office requesting an update on PA. Informed patient still in progress and will get a call when it is done.

## 2025-04-14 NOTE — TELEPHONE ENCOUNTER
Caller: Shelby Vogt    Relationship: Self    Best call back number:     [phone]       What was the call regarding: PATIENT CALLED AGAIN TO ASK ABOUT THE STATUS OF THIS PRIOR AUTHORIZATION.     PATIENT WOULD LIKE A CALLBACK

## 2025-04-16 NOTE — TELEPHONE ENCOUNTER
Patient called about getting the oral medication sent to [facility]. She is requesting a call back

## 2025-04-17 ENCOUNTER — TELEPHONE (OUTPATIENT)
Dept: FAMILY MEDICINE CLINIC | Facility: CLINIC | Age: 20
End: 2025-04-17

## 2025-04-17 RX ORDER — TOPIRAMATE 25 MG/1
25 TABLET, FILM COATED ORAL 2 TIMES DAILY
Qty: 60 TABLET | Refills: 2 | Status: SHIPPED | OUTPATIENT
Start: 2025-04-17

## 2025-04-17 NOTE — TELEPHONE ENCOUNTER
Caller: Shelby Vogt    Relationship: Self    Best call back number:   Telephone Information:   Mobile [phone]          Who are you requesting to speak with (clinical staff, provider,  specific staff member): PROVIDER     What was the call regarding: PATIENT WAS REQUESTING TO START TOPAMAX THAT SANTIAGO OLIVEIRA RECOMMENDED SINCE WEGOVY WASN'T COVERED HOWEVER SHE WAS ON THIS PREVIOUSLY 6 MONTHS AGO AND IT DID NOT HELP CAN WE INCREASE DOSAGE OR IF NOT IS THERE AN ALTERNATIVE MEDICATION NON INJECTABLE WEIGHT LOSS    PLEASE CALL AND UPDATE PATIENT

## 2025-04-21 DIAGNOSIS — Z30.011 ENCOUNTER FOR INITIAL PRESCRIPTION OF CONTRACEPTIVE PILLS: ICD-10-CM

## 2025-04-22 RX ORDER — NORETHINDRONE ACETATE AND ETHINYL ESTRADIOL, AND FERROUS FUMARATE 1MG-20(24)
1 KIT ORAL DAILY
Qty: 28 TABLET | Refills: 12 | OUTPATIENT
Start: 2025-04-22

## 2025-04-22 NOTE — TELEPHONE ENCOUNTER
Pt was last seen on 01/22/24, requesting refills of Carolyne 24 FE.  She has a follow up scheduled for 06/17/25 and the last refills were sent on 11/04/24 #28 with 12 refills.  She has enough for a year and this should last her until November of 2025.  I have denied the request and sent a message to the pt to call her pharmacy to arrange for this to be filled for her to .

## 2025-06-19 ENCOUNTER — PATIENT MESSAGE (OUTPATIENT)
Dept: OBSTETRICS AND GYNECOLOGY | Age: 20
End: 2025-06-19
Payer: COMMERCIAL

## 2025-06-19 ENCOUNTER — TELEPHONE (OUTPATIENT)
Dept: OBSTETRICS AND GYNECOLOGY | Age: 20
End: 2025-06-19

## 2025-06-19 DIAGNOSIS — Z30.011 ENCOUNTER FOR INITIAL PRESCRIPTION OF CONTRACEPTIVE PILLS: ICD-10-CM

## 2025-06-19 NOTE — TELEPHONE ENCOUNTER
Caller: Shelby Vogt    Relationship: Self    Best call back number: [phone] / LVM    Requested Prescriptions: Carolyne 24 Fe 1-20 MG-MCG(24) per tablet     Pharmacy where request should be sent: [facility]     Last office visit with prescribing clinician: Visit date not found   Last telemedicine visit with prescribing clinician: Visit date not found   Next office visit with prescribing clinician: 8/5/2025     Additional details provided by patient: PT WILL NEED AT LEAST 2mth REFILL UNTIL HER ANNUAL ON 08/05/22    Does the patient have less than a 3 day supply:  [] Yes  [x] No    Would you like a call back once the refill request has been completed: [x] Yes [] No    If the office needs to give you a call back, can they leave a voicemail: [x] Yes [] No    Chandu Escudero Rep   06/19/25 11:07 EDT

## 2025-06-19 NOTE — TELEPHONE ENCOUNTER
Caller: Shelby Vogt    Relationship:  Self    Best call back number: [phone]    PATIENT CALLED REQUESTING TO CANCEL SAME DAY APPT.    Did the patient call AFTER the start time of their scheduled appointment?  []YES  [x]NO    Was the patient's appointment rescheduled? [x]YES  []NO    Any additional information: SICK CHILD - R/S'd TO 08/05/25

## 2025-06-23 RX ORDER — NORETHINDRONE ACETATE AND ETHINYL ESTRADIOL, AND FERROUS FUMARATE 1MG-20(24)
1 KIT ORAL DAILY
Qty: 84 TABLET | Refills: 0 | Status: SHIPPED | OUTPATIENT
Start: 2025-06-23

## 2025-08-05 ENCOUNTER — OFFICE VISIT (OUTPATIENT)
Dept: OBSTETRICS AND GYNECOLOGY | Age: 20
End: 2025-08-05
Payer: COMMERCIAL

## 2025-08-05 VITALS
WEIGHT: 225 LBS | SYSTOLIC BLOOD PRESSURE: 138 MMHG | BODY MASS INDEX: 33.33 KG/M2 | HEART RATE: 88 BPM | DIASTOLIC BLOOD PRESSURE: 78 MMHG | HEIGHT: 69 IN

## 2025-08-05 DIAGNOSIS — Z01.419 ENCOUNTER FOR GYNECOLOGICAL EXAMINATION WITHOUT ABNORMAL FINDING: Primary | ICD-10-CM

## 2025-08-05 DIAGNOSIS — Z30.41 ENCOUNTER FOR SURVEILLANCE OF CONTRACEPTIVE PILLS: ICD-10-CM

## 2025-08-05 RX ORDER — NORETHINDRONE ACETATE AND ETHINYL ESTRADIOL, AND FERROUS FUMARATE 1MG-20(24)
1 KIT ORAL DAILY
Qty: 84 TABLET | Refills: 4 | Status: SHIPPED | OUTPATIENT
Start: 2025-08-05

## (undated) DEVICE — SUT MNCRYL 4/0 PS2 18 IN

## (undated) DEVICE — SUT CHRM 0 CT1 36IN 924H

## (undated) DEVICE — DEV TRANSF BLD W/LUER ADPT CA/198

## (undated) DEVICE — CVR HNDL LT SURG ACCSSRY BLU STRL

## (undated) DEVICE — SUT MNCRYL 0/0 CTX 36IN Y398H

## (undated) DEVICE — TRY CATH FOL ADVANCE SIL W/BAG 16F

## (undated) DEVICE — STERILE POLYISOPRENE POWDER-FREE SURGICAL GLOVES WITH EMOLLIENT COATING: Brand: PROTEXIS

## (undated) DEVICE — SUT MNCRYL 0 CT1 27IN VIL

## (undated) DEVICE — VIOLET BRAIDED (POLYGLACTIN 910), SYNTHETIC ABSORBABLE SUTURE: Brand: COATED VICRYL

## (undated) DEVICE — Device: Brand: PORTEX

## (undated) DEVICE — PAD GRND REM POLYHESIVE A/ DISP

## (undated) DEVICE — INTENDED FOR TISSUE SEPARATION, AND OTHER PROCEDURES THAT REQUIRE A SHARP SURGICAL BLADE TO PUNCTURE OR CUT.: Brand: BARD-PARKER ® CARBON RIB-BACK BLADES

## (undated) DEVICE — SUT VIC 3/0 CTI 36IN J944H

## (undated) DEVICE — C SECTION PACK: Brand: MEDLINE INDUSTRIES, INC.

## (undated) DEVICE — NEEDLE,18GX1.5",REG,BEVEL: Brand: MEDLINE

## (undated) DEVICE — GLV SURG BIOGEL LTX PF 7